# Patient Record
Sex: MALE | Race: OTHER | NOT HISPANIC OR LATINO
[De-identification: names, ages, dates, MRNs, and addresses within clinical notes are randomized per-mention and may not be internally consistent; named-entity substitution may affect disease eponyms.]

---

## 2017-11-29 ENCOUNTER — APPOINTMENT (OUTPATIENT)
Dept: OTOLARYNGOLOGY | Facility: CLINIC | Age: 37
End: 2017-11-29
Payer: COMMERCIAL

## 2017-11-29 VITALS
SYSTOLIC BLOOD PRESSURE: 128 MMHG | TEMPERATURE: 98.2 F | HEIGHT: 72 IN | WEIGHT: 215 LBS | HEART RATE: 61 BPM | DIASTOLIC BLOOD PRESSURE: 79 MMHG | BODY MASS INDEX: 29.12 KG/M2

## 2017-11-29 PROCEDURE — 99213 OFFICE O/P EST LOW 20 MIN: CPT

## 2018-12-12 ENCOUNTER — APPOINTMENT (OUTPATIENT)
Dept: OTOLARYNGOLOGY | Facility: CLINIC | Age: 38
End: 2018-12-12
Payer: COMMERCIAL

## 2018-12-12 VITALS
HEART RATE: 73 BPM | BODY MASS INDEX: 27.09 KG/M2 | HEIGHT: 72 IN | SYSTOLIC BLOOD PRESSURE: 130 MMHG | DIASTOLIC BLOOD PRESSURE: 92 MMHG | WEIGHT: 200 LBS | OXYGEN SATURATION: 99 %

## 2018-12-12 PROCEDURE — 99213 OFFICE O/P EST LOW 20 MIN: CPT

## 2019-06-13 ENCOUNTER — APPOINTMENT (OUTPATIENT)
Dept: SURGICAL ONCOLOGY | Facility: CLINIC | Age: 39
End: 2019-06-13
Payer: COMMERCIAL

## 2019-06-13 VITALS
RESPIRATION RATE: 16 BRPM | BODY MASS INDEX: 26.41 KG/M2 | HEART RATE: 57 BPM | HEIGHT: 72 IN | DIASTOLIC BLOOD PRESSURE: 89 MMHG | TEMPERATURE: 98 F | WEIGHT: 195 LBS | OXYGEN SATURATION: 100 % | SYSTOLIC BLOOD PRESSURE: 147 MMHG

## 2019-06-13 DIAGNOSIS — R22.30 LOCALIZED SWELLING, MASS AND LUMP, UNSPECIFIED UPPER LIMB: ICD-10-CM

## 2019-06-13 PROCEDURE — 99204 OFFICE O/P NEW MOD 45 MIN: CPT

## 2019-06-19 ENCOUNTER — FORM ENCOUNTER (OUTPATIENT)
Age: 39
End: 2019-06-19

## 2019-06-20 ENCOUNTER — OUTPATIENT (OUTPATIENT)
Dept: OUTPATIENT SERVICES | Facility: HOSPITAL | Age: 39
LOS: 1 days | End: 2019-06-20
Payer: COMMERCIAL

## 2019-06-20 ENCOUNTER — APPOINTMENT (OUTPATIENT)
Dept: ULTRASOUND IMAGING | Facility: CLINIC | Age: 39
End: 2019-06-20
Payer: COMMERCIAL

## 2019-06-20 ENCOUNTER — APPOINTMENT (OUTPATIENT)
Dept: RADIOLOGY | Facility: CLINIC | Age: 39
End: 2019-06-20
Payer: COMMERCIAL

## 2019-06-20 DIAGNOSIS — R22.30 LOCALIZED SWELLING, MASS AND LUMP, UNSPECIFIED UPPER LIMB: ICD-10-CM

## 2019-06-20 PROCEDURE — 76882 US LMTD JT/FCL EVL NVASC XTR: CPT

## 2019-06-20 PROCEDURE — 76882 US LMTD JT/FCL EVL NVASC XTR: CPT | Mod: 26,LT

## 2019-06-20 PROCEDURE — 71046 X-RAY EXAM CHEST 2 VIEWS: CPT

## 2019-06-20 PROCEDURE — 71046 X-RAY EXAM CHEST 2 VIEWS: CPT | Mod: 26

## 2019-12-09 ENCOUNTER — APPOINTMENT (OUTPATIENT)
Dept: OTOLARYNGOLOGY | Facility: CLINIC | Age: 39
End: 2019-12-09

## 2020-01-08 ENCOUNTER — APPOINTMENT (OUTPATIENT)
Dept: OTOLARYNGOLOGY | Facility: CLINIC | Age: 40
End: 2020-01-08
Payer: COMMERCIAL

## 2020-01-08 VITALS
SYSTOLIC BLOOD PRESSURE: 142 MMHG | BODY MASS INDEX: 25.73 KG/M2 | DIASTOLIC BLOOD PRESSURE: 86 MMHG | OXYGEN SATURATION: 98 % | WEIGHT: 190 LBS | HEIGHT: 72 IN

## 2020-01-08 PROCEDURE — 99213 OFFICE O/P EST LOW 20 MIN: CPT

## 2020-01-10 NOTE — ASSESSMENT
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 R TMJ pain.\par \par - F/U in 12 months.\par - Counseled on smoking cessation and increased risk of oral/OP malignancy.\par - Advised pt to see a oral/maxillofacial specialist for TMJ, he prefers to find someone local.\par - RTC should any worrisome symptoms develop. \par - Pt verbalized understanding of above plan.

## 2020-01-10 NOTE — HISTORY OF PRESENT ILLNESS
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 In 2015, he had questionable bilateral level II nodes, negative on FNA.\par \par Pt presents today with his mother Bernie. Pt is doing well, no changes in medical history. Denies fever, chills, weight loss, dysphagia, new neck masses/lesions. He reports right TMJ discomfort radiating to the ear.

## 2020-01-10 NOTE — REASON FOR VISIT
[Subsequent Evaluation] : a subsequent evaluation for [Parent] : parent [FreeTextEntry2] : surveillance of left hard palate mucoepidermoid ca

## 2020-04-17 NOTE — PHYSICAL EXAM
[Normal] : supple, no neck mass and thyroid not enlarged [Normal Neck Lymph Nodes] : normal neck lymph nodes  [Normal Supraclavicular Lymph Nodes] : normal supraclavicular lymph nodes [Normal Axillary Lymph Nodes] : normal axillary lymph nodes [Normal] : normal appearance, no rash, nodules, vesicles, ulcers, erythema [de-identified] : Groins not examined [de-identified] : Below

## 2020-04-17 NOTE — ASSESSMENT
[FreeTextEntry1] : The underlying cause for the abnormality on physical examination in the left axillary region is not apparent on my assessment, although I am able to see and feel the area.\par \par I suggested a chest x-ray and targeted ultrasound of the left axilla for further characterization.\par They are in agreement and a prescription was entered today.\par \par If there are no worrisome features, then short-term reexamination and re-imaging should be adequate.\par Any equivocal findings will be biopsied.\par \par Reviewed in detail, all questions answered\par \par \par 06/20/19:\par Palpable left axillary mass, on sonography, appears to represent a lipoma (2.5 x 0.9 x 2.5 cm).\par Amenable to clinical followup\par \par Accompanying chest x-ray was normal

## 2020-04-17 NOTE — REASON FOR VISIT
[Initial Consultation] : an initial consultation for [Other: _____] : [unfilled] [FreeTextEntry2] : Axillary mass

## 2020-04-17 NOTE — HISTORY OF PRESENT ILLNESS
[de-identified] : 38-year-old man being referred by his dermatologist Dr. Dex MAGAÑA with a "lipoma under (his left) arm".\par \par This is an asymptomatic mass that he noticed on self-examination February 2019.\par He brought it to the attention of his internist, who did not feel any intervention was warranted.\par It has not grown or changed, but due to persistence he had it reevaluated.\par His physician did not think it it changed either, however since it had not resolved and he was referred to dermatology for further assessment.\par \par Their evaluation led to this referral.\par \par +Low-grade MUCOEPIDERMOID CARCINOMA of his left HARD PALATE\par Resected November 2004 by Dr. Diaz COLBY.\par He did not require any adjuvant therapy.\par \par No other personal history of malignancy.\par \par His paternal grandfather had lung cancer.\par His paternal grandfather had skin cancer.\par No other relatives with a history of malignancy\par \par \par His internist is Dr. Christofer HUTCHINSON\par \par No other significant past medical history.\par No prescription medications.

## 2021-01-25 ENCOUNTER — APPOINTMENT (OUTPATIENT)
Dept: OTOLARYNGOLOGY | Facility: CLINIC | Age: 41
End: 2021-01-25
Payer: COMMERCIAL

## 2021-01-25 VITALS
HEART RATE: 69 BPM | BODY MASS INDEX: 25.73 KG/M2 | HEIGHT: 72 IN | WEIGHT: 190 LBS | TEMPERATURE: 98 F | OXYGEN SATURATION: 97 % | DIASTOLIC BLOOD PRESSURE: 73 MMHG | SYSTOLIC BLOOD PRESSURE: 125 MMHG

## 2021-01-25 PROCEDURE — 99072 ADDL SUPL MATRL&STAF TM PHE: CPT

## 2021-01-25 PROCEDURE — 99212 OFFICE O/P EST SF 10 MIN: CPT

## 2021-01-26 NOTE — HISTORY OF PRESENT ILLNESS
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 In 2015, he had questionable bilateral level II nodes, negative on FNA.\par \par Pt presents today with his mother Bernie. Pt is doing well, no changes in medical history. Denies fever, chills, weight loss, dysphagia, new neck masses/lesions. \par

## 2021-01-26 NOTE — ASSESSMENT
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 \par \par - F/U in 12 months.\par - RTC should any worrisome symptoms develop. \par - Pt verbalized understanding of above plan. \par \par

## 2021-01-26 NOTE — PHYSICAL EXAM
[Normal] : orientation to person, place, and time: normal [de-identified] : well healed left palate excision site without evidence of tumor, lesion, ulceration, erythema or edema

## 2021-01-26 NOTE — REASON FOR VISIT
[Subsequent Evaluation] : a subsequent evaluation for [Parent] : parent [FreeTextEntry2] : surveillance of left hard palate mucoepidermoid ca\par

## 2021-07-07 ENCOUNTER — APPOINTMENT (OUTPATIENT)
Dept: OTOLARYNGOLOGY | Facility: CLINIC | Age: 41
End: 2021-07-07
Payer: COMMERCIAL

## 2021-07-07 VITALS
HEART RATE: 73 BPM | TEMPERATURE: 97.9 F | OXYGEN SATURATION: 98 % | SYSTOLIC BLOOD PRESSURE: 147 MMHG | HEIGHT: 72 IN | WEIGHT: 190 LBS | BODY MASS INDEX: 25.73 KG/M2 | DIASTOLIC BLOOD PRESSURE: 88 MMHG

## 2021-07-07 DIAGNOSIS — M54.2 CERVICALGIA: ICD-10-CM

## 2021-07-07 PROCEDURE — 99072 ADDL SUPL MATRL&STAF TM PHE: CPT

## 2021-07-07 PROCEDURE — 99213 OFFICE O/P EST LOW 20 MIN: CPT

## 2021-07-09 PROBLEM — M54.2 NECK PAIN: Status: ACTIVE | Noted: 2021-07-01

## 2021-07-09 NOTE — HISTORY OF PRESENT ILLNESS
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 In 2015, he had questionable bilateral level II nodes, negative on FNA. [FreeTextEntry1] : Pt presents today with his mother Bernie. Reports jaw/TMJ pain and neck pain. Reports the neck pain has resolved.  He is going to follow up with a TMJ specialist.  Denies fever, chills, weight loss, dysphagia, new neck masses/lesions.  He has CT neck done yesterday and MRI scheduled for this evening.\par

## 2021-07-09 NOTE — PHYSICAL EXAM
[Normal] : orientation to person, place, and time: normal [de-identified] : TMJ pain [de-identified] : well healed left palate excision site without evidence of tumor, lesion, ulceration, erythema or edema

## 2021-07-09 NOTE — ASSESSMENT
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 c/o jaw pain, likely TMJD.\par \par - pending CT/MRI neck results - will call pt with results.\par - follow up with TMJ specialist\par - advised pt of my impending departure. Referred to Mehul Cobian for continuation of care.\par - Pt verbalized understanding of above plan. \par

## 2022-01-24 ENCOUNTER — APPOINTMENT (OUTPATIENT)
Dept: OTOLARYNGOLOGY | Facility: CLINIC | Age: 42
End: 2022-01-24

## 2022-08-02 ENCOUNTER — FORM ENCOUNTER (OUTPATIENT)
Age: 42
End: 2022-08-02

## 2022-08-02 ENCOUNTER — APPOINTMENT (OUTPATIENT)
Dept: ORTHOPEDIC SURGERY | Facility: CLINIC | Age: 42
End: 2022-08-02
Payer: COMMERCIAL

## 2022-08-02 VITALS — WEIGHT: 205 LBS | BODY MASS INDEX: 27.17 KG/M2 | HEIGHT: 73 IN

## 2022-08-02 PROCEDURE — 99203 OFFICE O/P NEW LOW 30 MIN: CPT

## 2022-08-02 PROCEDURE — 99204 OFFICE O/P NEW MOD 45 MIN: CPT
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
## 2022-08-02 NOTE — PHYSICAL EXAM
[Left] : left knee [Right] : right knee [AP] : anteroposterior [Lateral] : lateral [[place]] : skyline [Outside films reviewed] : Outside films reviewed [Fracture] : Fracture [] : negative Valgus instability [FreeTextEntry9] : segond fracture

## 2022-08-02 NOTE — PROCEDURE
[Large Joint Injection] : Large joint injection [Right] : of the right [Knee] : knee [Pain] : pain [Inflammation] : inflammation [Alcohol] : alcohol [Betadine] : betadine [Ethyl Chloride sprayed topically] : ethyl chloride sprayed topically [Sterile technique used] : sterile technique used [___ cc    1%] : Lidocaine ~Vcc of 1%  [Effusion] : effusion [de-identified] : 85cc [de-identified] : blood
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
## 2022-08-03 ENCOUNTER — APPOINTMENT (OUTPATIENT)
Dept: MRI IMAGING | Facility: CLINIC | Age: 42
End: 2022-08-03

## 2022-08-03 PROCEDURE — 73721 MRI JNT OF LWR EXTRE W/O DYE: CPT | Mod: RT

## 2022-08-04 ENCOUNTER — APPOINTMENT (OUTPATIENT)
Dept: ORTHOPEDIC SURGERY | Facility: CLINIC | Age: 42
End: 2022-08-04

## 2022-08-04 VITALS — BODY MASS INDEX: 27.17 KG/M2 | HEIGHT: 73 IN | WEIGHT: 205 LBS

## 2022-08-04 PROCEDURE — 99213 OFFICE O/P EST LOW 20 MIN: CPT | Mod: 57

## 2022-08-04 PROCEDURE — 99214 OFFICE O/P EST MOD 30 MIN: CPT

## 2022-08-04 NOTE — PHYSICAL EXAM
[Left] : left knee [Right] : right knee [AP] : anteroposterior [Lateral] : lateral [[place]] : skyline [Outside films reviewed] : Outside films reviewed [Fracture] : Fracture [] : negative Valgus instability [FreeTextEntry9] : segond fracture

## 2022-08-04 NOTE — HISTORY OF PRESENT ILLNESS
[8] : 8 [5] : 5 [de-identified] : 08/04/22: Here to f/up right knee and review MRI results. \par 08/02/2022 Mr. DEVIN SHETH,  41 year old  male , presents today for right knee. Injured on 07.29.22 with a twist and fall. Reports right knee pain and swelling. Difficulty with walking and has been using crutches for assistance. Also with left knee abrasion. \par  [FreeTextEntry1] : rt knee  [FreeTextEntry5] :  DEVIN SHETH is a 41 year male who is here today for rt knee MRI results.  [de-identified] : MRI

## 2022-08-04 NOTE — DATA REVIEWED
[MRI] : MRI [Right] : of the right [Knee] : knee [Report was reviewed and noted in the chart] : The report was reviewed and noted in the chart [I independently reviewed and interpreted images and report] : I independently reviewed and interpreted images and report [I reviewed the films/CD] : I reviewed the films/CD [FreeTextEntry1] : 1. Acute complete retracted ACL tear.\par 2. Complex medial meniscal tearing with multiple displaced meniscal flaps.\par 3. Posterior lateral meniscal root tearing is suspected associated with ligament of Wrisberg (Wrisberg's rip).\par 4. Moderate MCL sprain. Severe lateral capsular sprain with nondisplaced Segond fracture at the distal attachment at the lateral tibial plateau.\par 5. Mild PCL sprain.\par 6. Mild fibular collateral ligament sprain.\par 7. Subchondral fracture associated with contusions in the posterior medial and posterior lateral tibial plateau with depression in the posterior lip of the lateral tibial plateau where there is chondral injury and possible loose chondral fragments. \par 8. Small bone contusion in the peripheral lateral femoral condyle, effusion, soft tissue swelling and muscle strains.\par 9. Full thickness chondral loss in the central to posterior medial femoral condyle over an area measuring 1.5 cm.\par \par
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
## 2022-08-19 ENCOUNTER — APPOINTMENT (OUTPATIENT)
Dept: ORTHOPEDIC SURGERY | Facility: CLINIC | Age: 42
End: 2022-08-19

## 2022-09-01 ENCOUNTER — APPOINTMENT (OUTPATIENT)
Dept: ORTHOPEDIC SURGERY | Facility: CLINIC | Age: 42
End: 2022-09-01

## 2022-09-01 VITALS — WEIGHT: 205 LBS | BODY MASS INDEX: 27.77 KG/M2 | HEIGHT: 72 IN

## 2022-09-01 PROCEDURE — 99214 OFFICE O/P EST MOD 30 MIN: CPT

## 2022-09-01 NOTE — HISTORY OF PRESENT ILLNESS
[8] : 8 [5] : 5 [de-identified] : 09/01/22: Here to f/up right knee. PT and bracing has been mildly beneficial. Surgery had been discussed at last visit and pt is ready to schedule. \par 08/04/22: Here to f/up right knee and review MRI results. \par 08/02/2022 Mr. DEVIN SHETH,  41 year old  male , presents today for right knee. Injured on 07.29.22 with a twist and fall. Reports right knee pain and swelling. Difficulty with walking and has been using crutches for assistance. Also with left knee abrasion.  [FreeTextEntry1] : rt knee  [FreeTextEntry5] :  DEVIN SHETH is a 41 year male who is here today for rt knee MRI results.  [de-identified] : PT 2x a week - pt states this has helped reduce his pain\par Ice\par Knee brace

## 2022-09-01 NOTE — DISCUSSION/SUMMARY
[de-identified] : 41m with right knee complete tear of the ACL, complex medial meniscal tear, lateral meniscal tear ; also with Segond fracture, subchdonral fracture of the tibial plateaus and bone contusions and chondral loss. \par \par Reviewed surgical intervention vs. conservative management. Again discussed preop/postop periods, r/b/a, and the surgery in detail. pt given to opportunity to ask all questions and all questions were answered.  Pt and his wife are well informed and would like to proceed with surgery as discussed.\par \par Will plan for right knee ACL recon, allograft with meniscal root repair vs. partial resection\par \par \par The patient was advised of the diagnosis. The natural history of the pathology was explained in full to the patient in layman's terms. All questions were answered. The risks and benefits of surgical and non-surgical treatment alternatives were explained in full to the patient. \par The patient demonstrated a full understanding of the surgical and non-surgical options. The risks of surgery were outlined in full to the patient including but not limited to bleeding, scarring, infection, sepsis, neurologic injury, vascular injury, failure to resolve symptoms, symptom recurrence, the need for further surgery, non-healing, wound breakdown, deep vein thrombosis, pulmonary embolism, spontaneous osteonecrosis, anesthesia complications and even death. The patient understood all the risks and accepted them and understood that other complications could occur that are not mentioned above. The intraoperative plan, post-operative plan, post-operative expectations and limitations were explained in full. Expectations from non-surgical treatment were explained in full as well. The patient demonstrated a complete understanding of the treatment alternatives and requested the above-mentioned procedure. This will be scheduled accordingly\par  \par The patient was advised of the diagnosis. The natural history of the pathology was explained in full to the patient in layman's terms. All questions were answered. The risks and benefits of surgical and non-surgical treatment alternatives were explained in full to the patient. \par The patient demonstrated a full understanding of the surgical and non-surgical options. The risks of surgery were outlined in full to the patient including but not limited to bleeding, scarring, infection, sepsis, neurologic injury, vascular injury, failure to resolve symptoms, symptom recurrence, the need for further surgery, non-healing, wound breakdown, deep vein thrombosis, pulmonary embolism, spontaneous osteonecrosis, anesthesia complications and even death. \par More specifically, we discussed that there could be injury to the infrapatellar branch of the saphenous nerve, causing permanent numbness near the incision, re-rupture of the ACL, motion loss, persistent pain even after surgery, among other risks. \par The patient understood all the risks and accepted them and understood that other complications could occur that are not mentioned above. The intraoperative plan, post-operative plan, post-operative expectations and limitations were explained in full. Expectations from non-surgical treatment were explained in full as well. The patient demonstrated a complete understanding of the treatment alternatives and requested the above-mentioned procedure. This will be scheduled accordingly.\par \par \par

## 2022-09-01 NOTE — PHYSICAL EXAM
[Left] : left knee [Right] : right knee [AP] : anteroposterior [Lateral] : lateral [[place]] : skyline [Outside films reviewed] : Outside films reviewed [Fracture] : Fracture [] : negative Valgus instability [FreeTextEntry9] : segond fracture

## 2022-09-11 ENCOUNTER — FORM ENCOUNTER (OUTPATIENT)
Age: 42
End: 2022-09-11

## 2022-09-30 ENCOUNTER — APPOINTMENT (OUTPATIENT)
Dept: ORTHOPEDIC SURGERY | Facility: CLINIC | Age: 42
End: 2022-09-30

## 2022-09-30 VITALS — BODY MASS INDEX: 27.77 KG/M2 | WEIGHT: 205 LBS | HEIGHT: 72 IN

## 2022-09-30 VITALS — BODY MASS INDEX: 27.77 KG/M2 | HEIGHT: 72 IN | WEIGHT: 205 LBS

## 2022-09-30 PROCEDURE — 99214 OFFICE O/P EST MOD 30 MIN: CPT

## 2022-09-30 NOTE — PHYSICAL EXAM
[Left] : left knee [Right] : right knee [AP] : anteroposterior [Lateral] : lateral [[place]] : skyline [Outside films reviewed] : Outside films reviewed [Fracture] : Fracture [FreeTextEntry9] : segond fracture [NL (0)] : extension 0 degrees [] : negative Valgus instability [TWNoteComboBox7] : flexion 135 degrees

## 2022-09-30 NOTE — HISTORY OF PRESENT ILLNESS
[3] : 3 [0] : 0 [Localized] : localized [Ting[place]] : tingling [de-identified] : 09/30/22: Here to f/up right knee. Now scheduled for right knee ACL recon with medial meniscal root repair for 10.03.22. Presesnts today with additional questions prior to surgery.\par 09/01/22: Here to f/up right knee. PT and bracing has been mildly beneficial. Surgery had been discussed at last visit and pt is ready to schedule. \par 08/04/22: Here to f/up right knee and review MRI results. \par 08/02/2022 Mr. DEVIN SHETH,  41 year old  male , presents today for right knee. Injured on 07.29.22 with a twist and fall. Reports right knee pain and swelling. Difficulty with walking and has been using crutches for assistance. Also with left knee abrasion.  [] : no [FreeTextEntry1] : rt knee  [FreeTextEntry5] : PT states improvement since last visit. Pt noticed slight swelling in R knee. Pt states he completed PT ~3 weeks ago. [de-identified] : 9/1/2022 [de-identified] : Dr Delgado [de-identified] : PT 2x a week - pt states this has helped reduce his pain\par Ice\par Knee brace

## 2022-09-30 NOTE — DISCUSSION/SUMMARY
[de-identified] : 41m with right knee complete tear of the ACL, complex medial meniscal tear, lateral meniscal tear ; also with Segond fracture, subchdonral fracture of the tibial plateaus and bone contusions and chondral loss.  Scheduled for surgery 10.03.22.\par \par Reviewed r/b/a of surgical intervention and conservative management discussed. pt given to opportunity to ask all questions and all questions were answered.   Pt would like to proceed with surgery as discussed.\par \par \par \par \par \par

## 2022-10-03 ENCOUNTER — APPOINTMENT (OUTPATIENT)
Age: 42
End: 2022-10-03

## 2022-10-03 PROCEDURE — 29880 ARTHRS KNE SRG MNISECTMY M&L: CPT | Mod: 59,RT

## 2022-10-03 PROCEDURE — 29888 ARTHRS AID ACL RPR/AGMNTJ: CPT | Mod: RT

## 2022-10-03 PROCEDURE — 29888 ARTHRS AID ACL RPR/AGMNTJ: CPT | Mod: AS,RT

## 2022-10-03 PROCEDURE — 29880 ARTHRS KNE SRG MNISECTMY M&L: CPT | Mod: AS,59,RT

## 2022-10-03 RX ORDER — ASPIRIN 325 MG/1
325 TABLET, FILM COATED ORAL DAILY
Qty: 14 | Refills: 0 | Status: ACTIVE | COMMUNITY
Start: 2022-10-03 | End: 1900-01-01

## 2022-10-03 RX ORDER — OXYCODONE AND ACETAMINOPHEN 5; 325 MG/1; MG/1
5-325 TABLET ORAL
Qty: 30 | Refills: 0 | Status: ACTIVE | COMMUNITY
Start: 2022-10-03 | End: 1900-01-01

## 2022-10-06 ENCOUNTER — APPOINTMENT (OUTPATIENT)
Dept: ORTHOPEDIC SURGERY | Facility: CLINIC | Age: 42
End: 2022-10-06

## 2022-10-06 PROCEDURE — 99024 POSTOP FOLLOW-UP VISIT: CPT

## 2022-10-06 NOTE — HISTORY OF PRESENT ILLNESS
[3] : 3 [Dull/Aching] : dull/aching [Sharp] : sharp [Constant] : constant [Household chores] : household chores [Leisure] : leisure [Rest] : rest [Meds] : meds [Standing] : standing [Walking] : walking [de-identified] : dos: \par 10/06/2022: Pt here for pov1 s/p right knee ACL recon. Doing well. Complaint with brace. Denies f/c/s. \par \par 09/30/22: Here to f/up right knee. Now scheduled for right knee ACL recon with medial meniscal root repair for 10.03.22. Presents today with additional questions prior to surgery.\par 09/01/22: Here to f/up right knee. PT and bracing has been mildly beneficial. Surgery had been discussed at last visit and pt is ready to schedule. \par 08/04/22: Here to f/up right knee and review MRI results. \par 08/02/2022 Mr. DEVIN SHETH,  41 year old  male , presents today for right knee. Injured on 07.29.22 with a twist and fall. Reports right knee pain and swelling. Difficulty with walking and has been using crutches for assistance. Also with left knee abrasion.  [] : no [FreeTextEntry1] : Right knee [FreeTextEntry5] : DEVIN SHETH is a 41 year old M here for PO #1 for the right knee.  [FreeTextEntry6] : Soreness [de-identified] : 10/03/2022 [de-identified] : 10/3/2022

## 2022-10-06 NOTE — DISCUSSION/SUMMARY
[de-identified] : 41m s/p right knee ACL recon 10.03.22\par 1) c/w caryn brace locked in extension\par 2) reviewed post-op precautions and procedures.\par 3) start physical therapy, protocol provided\par 4) rtc  2 weeks \par \par Entered by Chanel Tinsley acting as scribe.\par

## 2022-10-18 ENCOUNTER — APPOINTMENT (OUTPATIENT)
Dept: ORTHOPEDIC SURGERY | Facility: CLINIC | Age: 42
End: 2022-10-18

## 2022-10-18 VITALS — WEIGHT: 205 LBS | BODY MASS INDEX: 27.77 KG/M2 | HEIGHT: 72 IN

## 2022-10-18 PROCEDURE — 99024 POSTOP FOLLOW-UP VISIT: CPT

## 2022-10-18 NOTE — DISCUSSION/SUMMARY
[de-identified] : 41m s/p right knee ACL recon 10.03.22\par 1) c/w caryn brace, unlocked, can take off to sleep and can d/c in 2 weeks\par 2) reviewed post-op precautions and procedures.\par 3) c/w physical therapy, as per protocol \par 4) rtc 4 weeks\par \par Entered by Chanel Tinsley acting as scribe.\par

## 2022-10-18 NOTE — HISTORY OF PRESENT ILLNESS
[3] : 3 [Dull/Aching] : dull/aching [Sharp] : sharp [Constant] : constant [Household chores] : household chores [Leisure] : leisure [Rest] : rest [Meds] : meds [Standing] : standing [Walking] : walking [de-identified] : dos: 10.036.22\par 10/18/22: here for pov2 s/p right knee acl recon. Doing well. Complaint with brace. Has started physical therapy.  \par 10/06/2022: Pt here for pov1 s/p right knee ACL recon. Doing well. Complaint with brace. Denies f/c/s. \par \par 09/30/22: Here to f/up right knee. Now scheduled for right knee ACL recon with medial meniscal root repair for 10.03.22. Presents today with additional questions prior to surgery.\par 09/01/22: Here to f/up right knee. PT and bracing has been mildly beneficial. Surgery had been discussed at last visit and pt is ready to schedule. \par 08/04/22: Here to f/up right knee and review MRI results. \par 08/02/2022 Mr. DEVIN SHETH,  41 year old  male , presents today for right knee. Injured on 07.29.22 with a twist and fall. Reports right knee pain and swelling. Difficulty with walking and has been using crutches for assistance. Also with left knee abrasion.  [] : no [FreeTextEntry1] : Right knee [FreeTextEntry5] : DEVIN SHETH is a 41 year old M here for PO #2 for the right knee. Pt states improvement since last visit. Pt states slight swelling in R knee. Pt has been compliant with PT going 3x a week. [FreeTextEntry6] : Soreness [de-identified] : 10/03/2022 [de-identified] : PT 3x a week [de-identified] : 10/3/2022

## 2022-11-15 ENCOUNTER — APPOINTMENT (OUTPATIENT)
Dept: ORTHOPEDIC SURGERY | Facility: CLINIC | Age: 42
End: 2022-11-15

## 2022-11-15 VITALS — HEIGHT: 72 IN | WEIGHT: 205 LBS | BODY MASS INDEX: 27.77 KG/M2

## 2022-11-15 PROCEDURE — 99024 POSTOP FOLLOW-UP VISIT: CPT
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
## 2022-11-15 NOTE — DISCUSSION/SUMMARY
[de-identified] : 41m s/p right knee ACL recon 10.03.22\par 1) aspiration right knee today - tolerated well \par 2) reviewed post-op precautions and procedures.\par 3) c/w physical therapy, as per protocol \par 4) rtc \par \par Entered by Chanel Tinsley acting as scribe.\par

## 2022-11-15 NOTE — PROCEDURE
[Large Joint Injection] : Large joint injection [Right] : of the right [Knee] : knee [Pain] : pain [Inflammation] : inflammation [Alcohol] : alcohol [Betadine] : betadine [Ethyl Chloride sprayed topically] : ethyl chloride sprayed topically [Sterile technique used] : sterile technique used [___ cc    1%] : Lidocaine ~Vcc of 1%  [___ cc    0.5%] : Bupivacaine (Marcaine) ~Vcc of 0.5%  [Effusion] : effusion [] : Patient tolerated procedure well [Call if redness, pain or fever occur] : call if redness, pain or fever occur [Prior failure or difficult injection] : prior failure or difficult injection [All ultrasound images have been permanently captured and stored accordingly in our picture archiving and communication system] : All ultrasound images have been permanently captured and stored accordingly in our picture archiving and communication system [Visualization of the needle and placement of injection was performed without complication] : visualization of the needle and placement of injection was performed without complication [de-identified] : 55cc [de-identified] : clear

## 2022-11-18 ENCOUNTER — APPOINTMENT (OUTPATIENT)
Dept: OTOLARYNGOLOGY | Facility: CLINIC | Age: 42
End: 2022-11-18

## 2022-12-29 ENCOUNTER — APPOINTMENT (OUTPATIENT)
Dept: ORTHOPEDIC SURGERY | Facility: CLINIC | Age: 42
End: 2022-12-29
Payer: COMMERCIAL

## 2022-12-29 PROCEDURE — L1852: CPT | Mod: RT

## 2022-12-29 PROCEDURE — 99024 POSTOP FOLLOW-UP VISIT: CPT

## 2022-12-29 NOTE — DISCUSSION/SUMMARY
[de-identified] : 41m s/p right knee ACL recon 10.03.22\par 1) c/w physical therapy and hep \par 2) cryotherapy, rest and activity modification\par 3) ACL functional brace\par 4) rtc 6 weeks, will consider aspiration if effusion persists. \par \par \par Entered by Chanel Tinsley acting as scribe.\par
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
## 2023-02-02 ENCOUNTER — APPOINTMENT (OUTPATIENT)
Dept: ORTHOPEDIC SURGERY | Facility: CLINIC | Age: 43
End: 2023-02-02
Payer: COMMERCIAL

## 2023-02-02 VITALS — WEIGHT: 215 LBS | HEIGHT: 72 IN | BODY MASS INDEX: 29.12 KG/M2

## 2023-02-02 PROCEDURE — 99213 OFFICE O/P EST LOW 20 MIN: CPT

## 2023-02-02 NOTE — PROCEDURE
[Other: ____] : [unfilled] [Right] : of the right [Knee] : knee [Effusion] : effusion [de-identified] : 35cc [de-identified] : clear/straw

## 2023-02-02 NOTE — DISCUSSION/SUMMARY
[de-identified] : 41m s/p right knee ACL recon 10.03.22\par 1) c/w physical therapy and hep \par 2) Aspiration right knee today - tolerated well\par 2) cryotherapy, rest and activity modification\par 3) c/w  ACL functional brace for increased activity. \par 4) rtc 2 months\par \par \par Entered by Hunter RICKS acting as scribe. \par

## 2023-02-02 NOTE — PHYSICAL EXAM
[Right] : right knee [NL (0)] : extension 0 degrees [] : negative Lachmann [TWNoteComboBox7] : flexion 130 degrees
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
## 2023-04-06 ENCOUNTER — APPOINTMENT (OUTPATIENT)
Dept: ORTHOPEDIC SURGERY | Facility: CLINIC | Age: 43
End: 2023-04-06
Payer: COMMERCIAL

## 2023-04-06 VITALS — HEIGHT: 74 IN | WEIGHT: 215 LBS | BODY MASS INDEX: 27.59 KG/M2

## 2023-04-06 DIAGNOSIS — M25.461 EFFUSION, RIGHT KNEE: ICD-10-CM

## 2023-04-06 PROCEDURE — 20611 DRAIN/INJ JOINT/BURSA W/US: CPT

## 2023-04-06 PROCEDURE — 99213 OFFICE O/P EST LOW 20 MIN: CPT | Mod: 25

## 2023-04-06 PROCEDURE — J3490M: CUSTOM

## 2023-04-06 NOTE — DISCUSSION/SUMMARY
[de-identified] : 41m s/p right knee ACL recon 10.03.22\par 1) c/w physical therapy and hep \par 2)  csi / Aspiration right knee today - tolerated well - 40cc\par 2) cryotherapy, rest and activity / exercise modification\par 3) c/w  ACL functional brace for increased activity. \par \par \par Entered by Chanel Tinsley acting as scribe.\par Dr. Delgado-\par The documentation recorded by the scribe accurately reflects the service I personally performed and the decisions made by me.

## 2023-04-06 NOTE — PROCEDURE
[Other: ____] : [unfilled] [Right] : of the right [Knee] : knee [Effusion] : effusion [de-identified] : 40cc [de-identified] : clear / straw [FreeTextEntry3] : Large joint injection was performed of the right knee. An injection of Lidocaine 3cc of 1% , Bupivacaine (Marcaine) 6cc of 0.5% , Triamcinolone (Kenalog) 2cc of 40 mg  was used. Patient was advised to call if redness, pain or fever occur and apply ice for 15 minutes out of every hour for the next 12-24 hours as tolerated. \par \par Patient has tried OTC's including aspirin, Ibuprofen, Aleve, etc or prescription NSAIDS, and/or exercises at home and/or physical therapy without satisfactory response, patient had decreased mobility in the joint and the risks benefits, and alternatives have been discussed, and verbal consent was obtained. \par The site was prepped with alcohol, betadine and ethyl chloride sprayed topically\par \par The risks, benefits and contents of the injection have been discussed.  Risks include but are not limited to allergic reaction, flare reaction, permanent white skin discoloration at the injection site and infection.  The patient understands the risks and agrees to having the injection.  All questions have been answered.\par \par Ultrasound guidance was indicated for this patient due to prior failure or difficult injection. All ultrasound images have been permanently captured and stored accordingly in our picture archiving and communication system.\par

## 2023-05-23 ENCOUNTER — APPOINTMENT (OUTPATIENT)
Dept: ORTHOPEDIC SURGERY | Facility: CLINIC | Age: 43
End: 2023-05-23
Payer: COMMERCIAL

## 2023-05-23 VITALS — HEIGHT: 74 IN | WEIGHT: 215 LBS | BODY MASS INDEX: 27.59 KG/M2

## 2023-05-23 DIAGNOSIS — M23.91 UNSPECIFIED INTERNAL DERANGEMENT OF RIGHT KNEE: ICD-10-CM

## 2023-05-23 PROCEDURE — 73564 X-RAY EXAM KNEE 4 OR MORE: CPT | Mod: RT

## 2023-05-23 PROCEDURE — 99213 OFFICE O/P EST LOW 20 MIN: CPT

## 2023-05-24 NOTE — PHYSICAL EXAM
[Right] : right knee [5___] : hamstring 5[unfilled]/5 [Positive] : positive anterior draw [] : no extensor lag [de-identified] : using knee sleeve [TWNoteComboBox7] : flexion 115 degrees [de-identified] : extension 5 degrees
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
## 2023-05-24 NOTE — IMAGING
[Right] : right knee [All Views] : anteroposterior, lateral, skyline, and anteroposterior standing [There are no fractures, subluxations or dislocations. No significant abnormalities are seen] : There are no fractures, subluxations or dislocations. No significant abnormalities are seen [Tunnels and hardware in proper position] : Tunnels and hardware in proper position
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
## 2023-05-31 ENCOUNTER — FORM ENCOUNTER (OUTPATIENT)
Age: 43
End: 2023-05-31

## 2023-06-01 ENCOUNTER — APPOINTMENT (OUTPATIENT)
Dept: MRI IMAGING | Facility: CLINIC | Age: 43
End: 2023-06-01
Payer: COMMERCIAL

## 2023-06-01 PROCEDURE — 73721 MRI JNT OF LWR EXTRE W/O DYE: CPT | Mod: RT

## 2023-06-06 ENCOUNTER — APPOINTMENT (OUTPATIENT)
Dept: ORTHOPEDIC SURGERY | Facility: CLINIC | Age: 43
End: 2023-06-06
Payer: COMMERCIAL

## 2023-06-06 VITALS — WEIGHT: 215 LBS | BODY MASS INDEX: 27.59 KG/M2 | HEIGHT: 74 IN

## 2023-06-06 PROCEDURE — 20610 DRAIN/INJ JOINT/BURSA W/O US: CPT | Mod: RT

## 2023-06-06 PROCEDURE — 99214 OFFICE O/P EST MOD 30 MIN: CPT | Mod: 25

## 2023-06-06 NOTE — PHYSICAL EXAM
[Right] : right knee [5___] : hamstring 5[unfilled]/5 [Positive] : positive anterior draw [] : no extensor lag [de-identified] : using knee sleeve [TWNoteComboBox7] : flexion 115 degrees [de-identified] : extension 5 degrees
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
## 2023-06-06 NOTE — DATA REVIEWED
[MRI] : MRI [Right] : of the right [Knee] : knee [Report was reviewed and noted in the chart] : The report was reviewed and noted in the chart [I independently reviewed and interpreted images and report] : I independently reviewed and interpreted images and report [I reviewed the films/CD] : I reviewed the films/CD [FreeTextEntry1] : 06.01.23\par 1. Findings consistent with complete recurrent ACL tear and recent anterior tibial translation episode with \par complex recurrent medial meniscal tearing including a displaced meniscal flap arising from the posterior root, subchondral fracture in the posterior lateral tibial plateau and bone contusions in the medial compartment.\par 2. Irregularity along the osteochondral surface of the anterior medial femoral condyle over an area measuring 3 cm where there appears to be severe chondral delamination and probable unstable chondral flaps with multiple small loose bodies suspected.\par 3. Mild PCL thickening and buckling consistent with ACL insufficiency, mild MCL laxity without acute tear, \par mild extensor mechanism tendinopathy, moderate-to-large effusion with diffuse synovitis, multiple plicae, and chondral loss with joint space narrowing in the medial compartment.\par 4. Clinical correlation regarding prior surgery and recurrent trauma is recommended.\par 5. MRI anterior drawer sign.
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
## 2023-06-19 NOTE — PHYSICAL EXAM
[Normal] : orientation to person, place, and time: normal [de-identified] : R TMJ pain and clicking [de-identified] : well healed left palate excision site without evidence of tumor, lesion, ulceration, erythema or edema  No

## 2023-06-20 ENCOUNTER — APPOINTMENT (OUTPATIENT)
Dept: ORTHOPEDIC SURGERY | Facility: CLINIC | Age: 43
End: 2023-06-20
Payer: COMMERCIAL

## 2023-06-20 VITALS — WEIGHT: 215 LBS | BODY MASS INDEX: 27.59 KG/M2 | HEIGHT: 74 IN

## 2023-06-20 DIAGNOSIS — S83.231A COMPLEX TEAR OF MEDIAL MENISCUS, CURRENT INJURY, RIGHT KNEE, INITIAL ENCOUNTER: ICD-10-CM

## 2023-06-20 PROCEDURE — 99214 OFFICE O/P EST MOD 30 MIN: CPT
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
## 2023-06-21 NOTE — PHYSICAL EXAM
[Right] : right knee [5___] : hamstring 5[unfilled]/5 [Positive] : positive anterior draw [] : no extensor lag [de-identified] : using knee sleeve [TWNoteComboBox7] : flexion 115 degrees [de-identified] : extension 5 degrees
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
## 2023-07-13 ENCOUNTER — APPOINTMENT (OUTPATIENT)
Dept: CT IMAGING | Facility: CLINIC | Age: 43
End: 2023-07-13

## 2023-07-13 ENCOUNTER — APPOINTMENT (OUTPATIENT)
Dept: MRI IMAGING | Facility: CLINIC | Age: 43
End: 2023-07-13

## 2023-07-14 ENCOUNTER — APPOINTMENT (OUTPATIENT)
Dept: ORTHOPEDIC SURGERY | Facility: CLINIC | Age: 43
End: 2023-07-14
Payer: COMMERCIAL

## 2023-07-14 PROCEDURE — L1833: CPT | Mod: RT

## 2023-07-17 ENCOUNTER — APPOINTMENT (OUTPATIENT)
Dept: ORTHOPEDIC SURGERY | Facility: AMBULATORY SURGERY CENTER | Age: 43
End: 2023-07-17
Payer: COMMERCIAL

## 2023-07-17 DIAGNOSIS — S83.281A OTHER TEAR OF LATERAL MENISCUS, CURRENT INJURY, RIGHT KNEE, INITIAL ENCOUNTER: ICD-10-CM

## 2023-07-17 DIAGNOSIS — S83.511A SPRAIN OF ANTERIOR CRUCIATE LIGAMENT OF RIGHT KNEE, INITIAL ENCOUNTER: ICD-10-CM

## 2023-07-17 PROCEDURE — 29888 ARTHRS AID ACL RPR/AGMNTJ: CPT | Mod: AS,RT

## 2023-07-17 PROCEDURE — 29881 ARTHRS KNE SRG MNISECTMY M/L: CPT | Mod: AS,59,RT

## 2023-07-17 PROCEDURE — 29881 ARTHRS KNE SRG MNISECTMY M/L: CPT | Mod: 59,RT

## 2023-07-17 PROCEDURE — 29888 ARTHRS AID ACL RPR/AGMNTJ: CPT | Mod: RT

## 2023-07-17 RX ORDER — OXYCODONE 5 MG/1
5 TABLET ORAL
Qty: 42 | Refills: 0 | Status: ACTIVE | COMMUNITY
Start: 2023-07-17 | End: 1900-01-01

## 2023-07-17 RX ORDER — ASPIRIN 325 MG/1
325 TABLET, FILM COATED ORAL DAILY
Qty: 14 | Refills: 0 | Status: ACTIVE | COMMUNITY
Start: 2023-07-17 | End: 1900-01-01

## 2023-07-24 ENCOUNTER — APPOINTMENT (OUTPATIENT)
Dept: ORTHOPEDIC SURGERY | Facility: CLINIC | Age: 43
End: 2023-07-24
Payer: COMMERCIAL

## 2023-07-24 VITALS — BODY MASS INDEX: 27.59 KG/M2 | WEIGHT: 215 LBS | HEIGHT: 74 IN

## 2023-07-24 PROCEDURE — 99024 POSTOP FOLLOW-UP VISIT: CPT

## 2023-07-24 NOTE — HISTORY OF PRESENT ILLNESS
[de-identified] : dos: 07.17.23 right knee ACL revision\par 07/24/23: Here for pov1 s/p right knee acl recon revision with allograft. Doing well. Denies f/c/s. Compliant with brace. \par 6/20/23: Here to f/up right knee recurrent ACL tear. \par 6/6/23: Here to f/up right knee and review MRI results. \par 5/23/23: 42M injured R knee 10 days ago in [place], [place], fell. Knee is swollen and feels unstable. Decreased motion. Ice applied.\par \par dos: 10.03.22 - right knee acl recon\par 4/6/23: Here to f/up right knee s/p acl recon. Overall doing well. Feels that he aggravated his knee after exercises including an hour on the treadmil and has been seeing increased swelling. \par  2/2/23: Here for follow up on the right knee s/p acl recon. Reports continued swelling over the right knee.  \par 12/29/22: Here to f/up s/p right knee acl recon. Doing well. Experiencing variable swelling / stiffness. \par 11/15/22: Here for pov s/p right knee acl recon. Attending PT. c/o of some persistent swelling over the right knee. \par 10/18/22: here for pov2 s/p right knee acl recon. Doing well. Complaint with brace. Has started physical therapy.  \par 10/06/2022: Pt here for pov1 s/p right knee ACL recon. Doing well. Complaint with brace. Denies f/c/s. \par \par 09/30/22: Here to f/up right knee. Now scheduled for right knee ACL recon with medial meniscal root repair for 10.03.22. Presents today with additional questions prior to surgery.\par 09/01/22: Here to f/up right knee. PT and bracing has been mildly beneficial. Surgery had been discussed at last visit and pt is ready to schedule. \par 08/04/22: Here to f/up right knee and review MRI results. \par 08/02/2022 Mr. DEVIN SHETH,  41 year old  male , presents today for right knee. Injured on 07.29.22 with a twist and fall. Reports right knee pain and swelling. Difficulty with walking and has been using crutches for assistance. Also with left knee abrasion.  [FreeTextEntry1] :  Rt Knee [FreeTextEntry5] : PO 1: Rt Knee Sx: 07/17/23. Pt pain slightly better. Pt feeling some N/T. Using crutched and B;edsoe brace for support. [de-identified] : sx: 07/17/23

## 2023-07-24 NOTE — DISCUSSION/SUMMARY
[de-identified] : 42m s/p right knee acl recon revision with allograft 07.17.23\par 1) c/w caryn brace locked in extension, wbat\par 2) start physical therapy - protocol provided\par 3) reviewed post-op precautions and procedures.\par 4) cryotherapy, rest and activity modification\par 5) rtc 2 weeks \par \par Entered by Chanel Tinsley acting as scribe.\par Dr. Delgado-\par The documentation recorded by the scribe accurately reflects the service I personally performed and the decisions made by me.

## 2023-08-08 ENCOUNTER — APPOINTMENT (OUTPATIENT)
Dept: ORTHOPEDIC SURGERY | Facility: CLINIC | Age: 43
End: 2023-08-08
Payer: COMMERCIAL

## 2023-08-08 VITALS — BODY MASS INDEX: 27.59 KG/M2 | WEIGHT: 215 LBS | HEIGHT: 74 IN

## 2023-08-08 PROCEDURE — 99024 POSTOP FOLLOW-UP VISIT: CPT

## 2023-08-08 NOTE — DISCUSSION/SUMMARY
[de-identified] : 42m s/p right knee acl recon revision with allograft 07.17.23 1) c/w caryn brace 0-90, wbat, crutches only as needed 2) c/w  physical therapy - protocol provided 3) reviewed post-op precautions and procedures. 4) cryotherapy, rest and activity modification 5) rtc 3 weeks   Entered by Chanel Tinsley acting as scribe. Dr. Delgado- The documentation recorded by the scribe accurately reflects the service I personally performed and the decisions made by me.

## 2023-08-08 NOTE — HISTORY OF PRESENT ILLNESS
[3] : 3 [de-identified] : dos: 07.17.23 right knee ACL revision 8/8/23: Here for pov s/p right knee acl recon revision. Attending PT. Doing well.  07/24/23: Here for pov1 s/p right knee acl recon revision with allograft. Doing well. Denies f/c/s. Compliant with brace.  6/20/23: Here to f/up right knee recurrent ACL tear.  6/6/23: Here to f/up right knee and review MRI results.  5/23/23: 42M injured R knee 10 days ago in [place], [place], fell. Knee is swollen and feels unstable. Decreased motion. Ice applied.  dos: 10.03.22 - right knee acl recon 4/6/23: Here to f/up right knee s/p acl recon. Overall doing well. Feels that he aggravated his knee after exercises including an hour on the treadmil and has been seeing increased swelling.   2/2/23: Here for follow up on the right knee s/p acl recon. Reports continued swelling over the right knee.   12/29/22: Here to f/up s/p right knee acl recon. Doing well. Experiencing variable swelling / stiffness.  11/15/22: Here for pov s/p right knee acl recon. Attending PT. c/o of some persistent swelling over the right knee.  10/18/22: here for pov2 s/p right knee acl recon. Doing well. Complaint with brace. Has started physical therapy.   10/06/2022: Pt here for pov1 s/p right knee ACL recon. Doing well. Complaint with brace. Denies f/c/s.   09/30/22: Here to f/up right knee. Now scheduled for right knee ACL recon with medial meniscal root repair for 10.03.22. Presents today with additional questions prior to surgery. 09/01/22: Here to f/up right knee. PT and bracing has been mildly beneficial. Surgery had been discussed at last visit and pt is ready to schedule.  08/04/22: Here to f/up right knee and review MRI results.  08/02/2022 Mr. DEVIN SHETH,  41 year old  male , presents today for right knee. Injured on 07.29.22 with a twist and fall. Reports right knee pain and swelling. Difficulty with walking and has been using crutches for assistance. Also with left knee abrasion.  [FreeTextEntry1] :  Rt Knee [FreeTextEntry5] : PO 2: Rt Knee Sx: 07/17/23. Pt pain slightly better. Pt still feeling some occasional N/T. Using crutches and Joey brace for support. Pt compliant with physical therapy going 3 times a week and feels like it's helping.  [de-identified] : Physical Therapy

## 2023-08-08 NOTE — PHYSICAL EXAM
[Right] : right knee [NL (0)] : extension 0 degrees [] : no extensor lag [TWNoteComboBox7] : flexion 125 degrees

## 2023-08-31 ENCOUNTER — APPOINTMENT (OUTPATIENT)
Dept: ORTHOPEDIC SURGERY | Facility: CLINIC | Age: 43
End: 2023-08-31
Payer: COMMERCIAL

## 2023-08-31 VITALS — WEIGHT: 215 LBS | HEIGHT: 74 IN | BODY MASS INDEX: 27.59 KG/M2

## 2023-08-31 PROCEDURE — 99024 POSTOP FOLLOW-UP VISIT: CPT

## 2023-08-31 NOTE — PHYSICAL EXAM
[Right] : right knee [NL (0)] : extension 0 degrees [Negative] : negative Inocencio's [] : light touch is intact throughout [TWNoteComboBox7] : flexion 125 degrees

## 2023-08-31 NOTE — DISCUSSION/SUMMARY
[de-identified] : 42m s/p right knee acl recon revision with allograft 07.17.23 1) can start to gradually d/c the brace 2) c/w physical therapy - protocol provided 3) reviewed post-op precautions and procedures. 4) cryotherapy, rest and activity modification 5) rtc 6 weeks   Entered by Chanel Tinsley acting as scribe. Dr. Delgado- The documentation recorded by the scribe accurately reflects the service I personally performed and the decisions made by me.

## 2023-08-31 NOTE — HISTORY OF PRESENT ILLNESS
[3] : 3 [0] : 0 [Tightness] : tightness [de-identified] : dos: 10.03.22 - right knee acl recon dos: 07.17.23 right knee ACL revision 8/31/23: Here for pov s/p right knee acl recon revision. Attending PT.  8/8/23: Here for pov s/p right knee acl recon revision. Attending PT. Doing well.  07/24/23: Here for pov1 s/p right knee acl recon revision with allograft. Doing well. Denies f/c/s. Compliant with brace.  6/20/23: Here to f/up right knee recurrent ACL tear.  6/6/23: Here to f/up right knee and review MRI results.  5/23/23: 42M injured R knee 10 days ago in [place], [place], fell. Knee is swollen and feels unstable. Decreased motion. Ice applied. _______________ 4/6/23: Here to f/up right knee s/p acl recon. Overall doing well. Feels that he aggravated his knee after exercises including an hour on the treadmil and has been seeing increased swelling.   2/2/23: Here for follow up on the right knee s/p acl recon. Reports continued swelling over the right knee.   12/29/22: Here to f/up s/p right knee acl recon. Doing well. Experiencing variable swelling / stiffness.  11/15/22: Here for pov s/p right knee acl recon. Attending PT. c/o of some persistent swelling over the right knee.  10/18/22: here for pov2 s/p right knee acl recon. Doing well. Complaint with brace. Has started physical therapy.   10/06/2022: Pt here for pov1 s/p right knee ACL recon. Doing well. Complaint with brace. Denies f/c/s.  ________________ 09/30/22: Here to f/up right knee. Now scheduled for right knee ACL recon with medial meniscal root repair for 10.03.22. Presents today with additional questions prior to surgery. 09/01/22: Here to f/up right knee. PT and bracing has been mildly beneficial. Surgery had been discussed at last visit and pt is ready to schedule.  08/04/22: Here to f/up right knee and review MRI results.  08/02/2022 Mr. DEVIN SHETH,  41 year old  male , presents today for right knee. Injured on 07.29.22 with a twist and fall. Reports right knee pain and swelling. Difficulty with walking and has been using crutches for assistance. Also with left knee abrasion.  [] : no [FreeTextEntry1] :  Rt Knee [FreeTextEntry5] : PO 2: Rt Knee Sx: 07/17/23. States pain has decreased. Feels he slightly tweaked it. Joey brace for support. Goes to PT 3x a week. States its helping.  [de-identified] : Physical Therapy

## 2023-10-12 ENCOUNTER — APPOINTMENT (OUTPATIENT)
Dept: ORTHOPEDIC SURGERY | Facility: CLINIC | Age: 43
End: 2023-10-12
Payer: COMMERCIAL

## 2023-10-12 VITALS — BODY MASS INDEX: 27.59 KG/M2 | WEIGHT: 215 LBS | HEIGHT: 74 IN

## 2023-10-12 PROCEDURE — 99024 POSTOP FOLLOW-UP VISIT: CPT

## 2023-10-18 ENCOUNTER — APPOINTMENT (OUTPATIENT)
Dept: OTOLARYNGOLOGY | Facility: CLINIC | Age: 43
End: 2023-10-18
Payer: COMMERCIAL

## 2023-10-18 VITALS — WEIGHT: 210 LBS | BODY MASS INDEX: 27.83 KG/M2 | HEIGHT: 73 IN

## 2023-10-18 DIAGNOSIS — C05.9 MALIGNANT NEOPLASM OF PALATE, UNSPECIFIED: ICD-10-CM

## 2023-10-18 DIAGNOSIS — M26.629 ARTHRALGIA OF TEMPOROMANDIBULAR JOINT,: ICD-10-CM

## 2023-10-18 DIAGNOSIS — H61.23 IMPACTED CERUMEN, BILATERAL: ICD-10-CM

## 2023-10-18 DIAGNOSIS — R68.84 JAW PAIN: ICD-10-CM

## 2023-10-18 PROCEDURE — 99214 OFFICE O/P EST MOD 30 MIN: CPT | Mod: 25

## 2023-10-18 PROCEDURE — 69210 REMOVE IMPACTED EAR WAX UNI: CPT

## 2023-10-18 PROCEDURE — 31575 DIAGNOSTIC LARYNGOSCOPY: CPT

## 2023-10-18 RX ORDER — ALPRAZOLAM 2 MG/1
TABLET ORAL
Refills: 0 | Status: ACTIVE | COMMUNITY

## 2023-10-18 RX ORDER — OFLOXACIN OTIC 3 MG/ML
0.3 SOLUTION AURICULAR (OTIC) TWICE DAILY
Qty: 1 | Refills: 2 | Status: ACTIVE | COMMUNITY
Start: 2023-10-18 | End: 1900-01-01

## 2023-10-18 RX ORDER — PANTOPRAZOLE 40 MG/1
40 TABLET, DELAYED RELEASE ORAL
Qty: 30 | Refills: 3 | Status: ACTIVE | COMMUNITY
Start: 2023-10-18 | End: 1900-01-01

## 2023-11-24 ENCOUNTER — APPOINTMENT (OUTPATIENT)
Dept: ORTHOPEDIC SURGERY | Facility: CLINIC | Age: 43
End: 2023-11-24
Payer: COMMERCIAL

## 2023-11-24 VITALS — BODY MASS INDEX: 27.83 KG/M2 | WEIGHT: 210 LBS | HEIGHT: 73 IN

## 2023-11-24 PROCEDURE — 99213 OFFICE O/P EST LOW 20 MIN: CPT

## 2023-12-11 ENCOUNTER — NON-APPOINTMENT (OUTPATIENT)
Age: 43
End: 2023-12-11

## 2024-01-22 ENCOUNTER — APPOINTMENT (OUTPATIENT)
Dept: ORTHOPEDIC SURGERY | Facility: CLINIC | Age: 44
End: 2024-01-22
Payer: COMMERCIAL

## 2024-01-22 PROCEDURE — 99213 OFFICE O/P EST LOW 20 MIN: CPT

## 2024-01-22 NOTE — DISCUSSION/SUMMARY
[de-identified] : 43m s/p right knee acl recon revision with allograft 07.17.23 1) c/w physical therapy and hep  2) cryotherapy, rest and activity modification 3) rtc 3 months  Entered by Chanel Tinsley acting as scribe. Dr. Delgado- The documentation recorded by the scribe accurately reflects the service I personally performed and the decisions made by me.

## 2024-01-22 NOTE — HISTORY OF PRESENT ILLNESS
[3] : 3 [Tightness] : tightness [Intermittent] : intermittent [] : yes [de-identified] : dos: 10.03.22 - right knee acl recon dos: 07.17.23 right knee ACL revision 1/22/24: Here to f/up right knee. Overall doing well. C/o persistent stiffness and weakness. Attending PT.  11/24/23: s/p right knee ACL revision. He is doing well but tweaked his knee last week. He has been doing PT with good progress. Some swelling with increased activity.  10/12/23: s/p right knee ACL revision. Currently doing PT 3x a week. Doing well. Noticed some swelling after increased activity.   8/31/23: Here for pov s/p right knee acl recon revision. Attending PT.  8/8/23: Here for pov s/p right knee acl recon revision. Attending PT. Doing well.  07/24/23: Here for pov1 s/p right knee acl recon revision with allograft. Doing well. Denies f/c/s. Compliant with brace.  6/20/23: Here to f/up right knee recurrent ACL tear.  6/6/23: Here to f/up right knee and review MRI results.  5/23/23: 42M injured R knee 10 days ago in [place], [place], fell. Knee is swollen and feels unstable. Decreased motion. Ice applied. _______________ 4/6/23: Here to f/up right knee s/p acl recon. Overall doing well. Feels that he aggravated his knee after exercises including an hour on the treadmil and has been seeing increased swelling.   2/2/23: Here for follow up on the right knee s/p acl recon. Reports continued swelling over the right knee.   12/29/22: Here to f/up s/p right knee acl recon. Doing well. Experiencing variable swelling / stiffness.  11/15/22: Here for pov s/p right knee acl recon. Attending PT. c/o of some persistent swelling over the right knee.  10/18/22: here for pov2 s/p right knee acl recon. Doing well. Complaint with brace. Has started physical therapy.   10/06/2022: Pt here for pov1 s/p right knee ACL recon. Doing well. Complaint with brace. Denies f/c/s.  ________________ 09/30/22: Here to f/up right knee. Now scheduled for right knee ACL recon with medial meniscal root repair for 10.03.22. Presents today with additional questions prior to surgery. 09/01/22: Here to f/up right knee. PT and bracing has been mildly beneficial. Surgery had been discussed at last visit and pt is ready to schedule.  08/04/22: Here to f/up right knee and review MRI results.  08/02/2022 Mr. DEVIN SHETH,  41 year old  male , presents today for right knee. Injured on 07.29.22 with a twist and fall. Reports right knee pain and swelling. Difficulty with walking and has been using crutches for assistance. Also with left knee abrasion.  [FreeTextEntry1] : right knee  [FreeTextEntry5] : DEVIN is here today to follow up on his right knee. pt states since last visit, pain decreased. attending PT. states he weaned off of brace. c/o tightness > pain, some discomfort with cold weather.  [de-identified] : 07/17/23

## 2024-01-22 NOTE — PHYSICAL EXAM
[Right] : right knee [NL (0)] : extension 0 degrees [Negative] : negative Inocencio's [] : no pain with varus stress [FreeTextEntry8] : calf is soft and compressible, no sign of dvt  [TWNoteComboBox7] : flexion 130 degrees

## 2024-03-25 ENCOUNTER — APPOINTMENT (OUTPATIENT)
Dept: ORTHOPEDIC SURGERY | Facility: CLINIC | Age: 44
End: 2024-03-25
Payer: COMMERCIAL

## 2024-03-25 VITALS — WEIGHT: 210 LBS | BODY MASS INDEX: 27.83 KG/M2 | HEIGHT: 73 IN

## 2024-03-25 PROCEDURE — 99213 OFFICE O/P EST LOW 20 MIN: CPT

## 2024-03-25 NOTE — HISTORY OF PRESENT ILLNESS
[0] : 0 [Intermittent] : intermittent [Tightness] : tightness [] : yes [de-identified] : dos: 10.03.22 - right knee acl recon dos: 07.17.23 right knee ACL revision 3/25/24: Here to f/up right knee. Attending PT, doing well  1/22/24: Here to f/up right knee. Overall doing well. C/o persistent stiffness and weakness. Attending PT.  11/24/23: s/p right knee ACL revision. He is doing well but tweaked his knee last week. He has been doing PT with good progress. Some swelling with increased activity.  10/12/23: s/p right knee ACL revision. Currently doing PT 3x a week. Doing well. Noticed some swelling after increased activity.   8/31/23: Here for pov s/p right knee acl recon revision. Attending PT.  8/8/23: Here for pov s/p right knee acl recon revision. Attending PT. Doing well.  07/24/23: Here for pov1 s/p right knee acl recon revision with allograft. Doing well. Denies f/c/s. Compliant with brace.  6/20/23: Here to f/up right knee recurrent ACL tear.  6/6/23: Here to f/up right knee and review MRI results.  5/23/23: 42M injured R knee 10 days ago in [place], [place], fell. Knee is swollen and feels unstable. Decreased motion. Ice applied. _______________ 4/6/23: Here to f/up right knee s/p acl recon. Overall doing well. Feels that he aggravated his knee after exercises including an hour on the treadmil and has been seeing increased swelling.   2/2/23: Here for follow up on the right knee s/p acl recon. Reports continued swelling over the right knee.   12/29/22: Here to f/up s/p right knee acl recon. Doing well. Experiencing variable swelling / stiffness.  11/15/22: Here for pov s/p right knee acl recon. Attending PT. c/o of some persistent swelling over the right knee.  10/18/22: here for pov2 s/p right knee acl recon. Doing well. Complaint with brace. Has started physical therapy.   10/06/2022: Pt here for pov1 s/p right knee ACL recon. Doing well. Complaint with brace. Denies f/c/s.  ________________ 09/30/22: Here to f/up right knee. Now scheduled for right knee ACL recon with medial meniscal root repair for 10.03.22. Presents today with additional questions prior to surgery. 09/01/22: Here to f/up right knee. PT and bracing has been mildly beneficial. Surgery had been discussed at last visit and pt is ready to schedule.  08/04/22: Here to f/up right knee and review MRI results.  08/02/2022 Mr. DEVIN SHETH,  41 year old  male , presents today for right knee. Injured on 07.29.22 with a twist and fall. Reports right knee pain and swelling. Difficulty with walking and has been using crutches for assistance. Also with left knee abrasion.  [FreeTextEntry1] : right knee  [FreeTextEntry5] : continuing PT and HEP [de-identified] : 07/17/23

## 2024-03-25 NOTE — PHYSICAL EXAM
[Right] : right knee [NL (0)] : extension 0 degrees [Negative] : negative Inocencio's [] : non-antalgic [FreeTextEntry8] : calf is soft and compressible, no sign of dvt  [de-identified] : quad strength improving  [TWNoteComboBox7] : flexion 130 degrees

## 2024-03-25 NOTE — DISCUSSION/SUMMARY
[de-identified] : 43m s/p right knee acl recon revision with allograft 07.17.23. Improving well with PT 1) c/w PT and hep  2) cryotherapy, rest and activity modification  3) rtc 4 months at 1 year post-op   Entered by Chanel Tinsley acting as scribe. Dr. Delgado- The documentation recorded by the scribe accurately reflects the service I personally performed and the decisions made by me.

## 2024-05-21 ENCOUNTER — APPOINTMENT (OUTPATIENT)
Dept: ORTHOPEDIC SURGERY | Facility: CLINIC | Age: 44
End: 2024-05-21
Payer: COMMERCIAL

## 2024-05-21 DIAGNOSIS — Z98.890 OTHER SPECIFIED POSTPROCEDURAL STATES: ICD-10-CM

## 2024-05-21 PROCEDURE — 99213 OFFICE O/P EST LOW 20 MIN: CPT

## 2024-05-21 NOTE — PHYSICAL EXAM
[Right] : right knee [NL (0)] : extension 0 degrees [Negative] : negative Inocencio's [] : non-antalgic [FreeTextEntry8] : calf is soft and compressible, no sign of dvt  [de-identified] : quad strength improving  [TWNoteComboBox7] : flexion 130 degrees

## 2024-05-21 NOTE — HISTORY OF PRESENT ILLNESS
[7] : 7 [6] : 6 [Dull/Aching] : dull/aching [Tightness] : tightness [Intermittent] : intermittent [Household chores] : household chores [Work] : work [Nothing helps with pain getting better] : Nothing helps with pain getting better [de-identified] : dos: 10.03.22 - right knee acl recon dos: 07.17.23 right knee ACL revision 5/21/24: Here to f/up right knee. Reports that on 5/13 he was playing with his kids and jumped up to catch a ball, landed on his right leg and has had some increased right knee pain.  3/25/24: Here to f/up right knee. Attending PT, doing well  1/22/24: Here to f/up right knee. Overall doing well. C/o persistent stiffness and weakness. Attending PT.  11/24/23: s/p right knee ACL revision. He is doing well but tweaked his knee last week. He has been doing PT with good progress. Some swelling with increased activity.  10/12/23: s/p right knee ACL revision. Currently doing PT 3x a week. Doing well. Noticed some swelling after increased activity.   8/31/23: Here for pov s/p right knee acl recon revision. Attending PT.  8/8/23: Here for pov s/p right knee acl recon revision. Attending PT. Doing well.  07/24/23: Here for pov1 s/p right knee acl recon revision with allograft. Doing well. Denies f/c/s. Compliant with brace.  6/20/23: Here to f/up right knee recurrent ACL tear.  6/6/23: Here to f/up right knee and review MRI results.  5/23/23: 42M injured R knee 10 days ago in [place], [place], fell. Knee is swollen and feels unstable. Decreased motion. Ice applied. _______________ 4/6/23: Here to f/up right knee s/p acl recon. Overall doing well. Feels that he aggravated his knee after exercises including an hour on the treadmil and has been seeing increased swelling.   2/2/23: Here for follow up on the right knee s/p acl recon. Reports continued swelling over the right knee.   12/29/22: Here to f/up s/p right knee acl recon. Doing well. Experiencing variable swelling / stiffness.  11/15/22: Here for pov s/p right knee acl recon. Attending PT. c/o of some persistent swelling over the right knee.  10/18/22: here for pov2 s/p right knee acl recon. Doing well. Complaint with brace. Has started physical therapy.   10/06/2022: Pt here for pov1 s/p right knee ACL recon. Doing well. Complaint with brace. Denies f/c/s.  ________________ 09/30/22: Here to f/up right knee. Now scheduled for right knee ACL recon with medial meniscal root repair for 10.03.22. Presents today with additional questions prior to surgery. 09/01/22: Here to f/up right knee. PT and bracing has been mildly beneficial. Surgery had been discussed at last visit and pt is ready to schedule.  08/04/22: Here to f/up right knee and review MRI results.  08/02/2022 Mr. DEVIN SHETH,  41 year old  male , presents today for right knee. Injured on 07.29.22 with a twist and fall. Reports right knee pain and swelling. Difficulty with walking and has been using crutches for assistance. Also with left knee abrasion.  [] : no [FreeTextEntry1] : right knee  [FreeTextEntry5] : pt is here for RT knee pain, states he was out playing 05/13/24, was playing lacrosse, jumped high to receive the ball, landing more on rt knee, felt pressure, slight swelling, feels weak.  [de-identified] : certain movement  [de-identified] : 07/17/23

## 2024-05-21 NOTE — DISCUSSION/SUMMARY
[de-identified] : 43m s/p right knee acl recon revision with allograft 07.17.23. Improving well with PT. s/p aggravation of pain 1) c/w PT and hep  2) cryotherapy, rest and activity modification  3) rtc 2 months   Entered by Chanel Tinsley acting as scribe. Dr. Delgado- The documentation recorded by the scribe accurately reflects the service I personally performed and the decisions made by me.

## 2024-07-25 ENCOUNTER — APPOINTMENT (OUTPATIENT)
Dept: ORTHOPEDIC SURGERY | Facility: CLINIC | Age: 44
End: 2024-07-25
Payer: COMMERCIAL

## 2024-07-25 VITALS — HEIGHT: 73 IN | WEIGHT: 210 LBS | BODY MASS INDEX: 27.83 KG/M2

## 2024-07-25 DIAGNOSIS — Z98.890 OTHER SPECIFIED POSTPROCEDURAL STATES: ICD-10-CM

## 2024-07-25 PROCEDURE — 99213 OFFICE O/P EST LOW 20 MIN: CPT

## 2024-07-25 NOTE — PHYSICAL EXAM
[Right] : right knee [NL (0)] : extension 0 degrees [Negative] : negative Inocencio's [] : non-antalgic [FreeTextEntry8] : calf is soft and compressible, no sign of dvt  [de-identified] : quad strength improving  [TWNoteComboBox7] : flexion 135 degrees

## 2024-07-25 NOTE — HISTORY OF PRESENT ILLNESS
[facility]  Operative Note    Date:  2024    Patient:  Cinthya Stoddard  MRN:  [id number]   :  1956    Surgeon:  Vladimir Steward MD    PREOPERATIVE DIAGNOSIS:  Visually significant age-related nuclear and cortical cataract, left eye.    POSTOPERATIVE DIAGNOSIS:  Visually significant age-related nuclear and cortical cataract, left eye.    PROCEDURE:  Phacoemulsification of cataract with posterior chamber intraocular lens implant, left eye.    ANESTHESIA:  Topical with MAC.      COMPLICATIONS:  None.    ESTIMATED BLOOD LOSS:  Minimal.    PROCEDURE IN DETAIL:  The patient presented to our clinic complaining of increasing difficulties with activities of daily living due to a visually significant cataract in the left eye.  After risks, benefits, and alternatives were explained to the patient, the patient agreed to proceed with the above procedure.  The patient was brought to the operating room and received topical anesthetic to the left eye and was then prepped and draped in the usual sterile fashion.  The left eye was first entered at 5:00 o'clock paracentesis site followed by intracameral lidocaine, and Viscoat.  The primary surgical site was then created at 2:30 o'clock followed by continuous capsulotomy, hydrodissection, and phacoemulsification of the cataract.  Residual cortical material was removed using the automated irrigation-aspiration technique.  Provisc was injected into the posterior chamber and an Conrado SY60WF 22.5 diopter lens was placed in the bag without difficulty.  Residual Provisc was removed using the automated irrigation-aspiration technique.  The eye was re-pressurized using BSS solution, and both the paracentesis and primary surgical site were demonstrated to be watertight at the end of the case with Weck-Camille manipulation.  Vigamox, Pred Forte, and Prolensa were placed in the eye followed by placement of a Willett shield.  The patient tolerated the procedure well and was taken to  [6] : 6 the recovery room in good and stable condition.  The patient was instructed to refrain from any heavy lifting, bending, stooping, or straining activities and to follow-up in the morning for routine post-operative care with Dr. Vladimir Steward.     [Dull/Aching] : dull/aching [Tightness] : tightness [Intermittent] : intermittent [Household chores] : household chores [Work] : work [Nothing helps with pain getting better] : Nothing helps with pain getting better [3] : 3 [de-identified] : dos: 10.03.22 - right knee acl recon dos: 07.17.23 right knee ACL revision 7/25/24: Here to f/up right knee, now 1 year s/p ACL revision. Doing well. Minimal remaining complaints, occasional stiffness or sense of "pressure" over the anterior aspect.  5/21/24: Here to f/up right knee. Reports that on 5/13 he was playing with his kids and jumped up to catch a ball, landed on his right leg and has had some increased right knee pain.  3/25/24: Here to f/up right knee. Attending PT, doing well  1/22/24: Here to f/up right knee. Overall doing well. C/o persistent stiffness and weakness. Attending PT.  11/24/23: s/p right knee ACL revision. He is doing well but tweaked his knee last week. He has been doing PT with good progress. Some swelling with increased activity.  10/12/23: s/p right knee ACL revision. Currently doing PT 3x a week. Doing well. Noticed some swelling after increased activity.   8/31/23: Here for pov s/p right knee acl recon revision. Attending PT.  8/8/23: Here for pov s/p right knee acl recon revision. Attending PT. Doing well.  07/24/23: Here for pov1 s/p right knee acl recon revision with allograft. Doing well. Denies f/c/s. Compliant with brace.  6/20/23: Here to f/up right knee recurrent ACL tear.  6/6/23: Here to f/up right knee and review MRI results.  5/23/23: 42M injured R knee 10 days ago in [place], [place], fell. Knee is swollen and feels unstable. Decreased motion. Ice applied. _______________ 4/6/23: Here to f/up right knee s/p acl recon. Overall doing well. Feels that he aggravated his knee after exercises including an hour on the treadmil and has been seeing increased swelling.   2/2/23: Here for follow up on the right knee s/p acl recon. Reports continued swelling over the right knee.   12/29/22: Here to f/up s/p right knee acl recon. Doing well. Experiencing variable swelling / stiffness.  11/15/22: Here for pov s/p right knee acl recon. Attending PT. c/o of some persistent swelling over the right knee.  10/18/22: here for pov2 s/p right knee acl recon. Doing well. Complaint with brace. Has started physical therapy.   10/06/2022: Pt here for pov1 s/p right knee ACL recon. Doing well. Complaint with brace. Denies f/c/s.  ________________ 09/30/22: Here to f/up right knee. Now scheduled for right knee ACL recon with medial meniscal root repair for 10.03.22. Presents today with additional questions prior to surgery. 09/01/22: Here to f/up right knee. PT and bracing has been mildly beneficial. Surgery had been discussed at last visit and pt is ready to schedule.  08/04/22: Here to f/up right knee and review MRI results.  08/02/2022 Mr. DEVIN SHETH,  41 year old  male , presents today for right knee. Injured on 07.29.22 with a twist and fall. Reports right knee pain and swelling. Difficulty with walking and has been using crutches for assistance. Also with left knee abrasion.  [] : no [FreeTextEntry1] : right knee  [FreeTextEntry5] : continuing 3/wk with some improvement. still experiencing some pressure. [de-identified] : certain movement  [de-identified] : 07/17/23

## 2024-07-25 NOTE — DISCUSSION/SUMMARY
[de-identified] : 43m now 1 year s/p s/p right knee acl recon revision with allograft 07.17.23.  1) PT / hep ; strengthening 2) cryotherapy, rest and activity modification  3) Discussed gradual increase with activity / exercise as tolerated 4) rtc 3 months   Entered by Chanel Tinsley acting as scribe. Dr. Delgado- The documentation recorded by the scribe accurately reflects the service I personally performed and the decisions made by me.

## 2024-11-22 ENCOUNTER — APPOINTMENT (OUTPATIENT)
Dept: ORTHOPEDIC SURGERY | Facility: CLINIC | Age: 44
End: 2024-11-22
Payer: COMMERCIAL

## 2024-11-22 DIAGNOSIS — Z98.890 OTHER SPECIFIED POSTPROCEDURAL STATES: ICD-10-CM

## 2024-11-22 PROCEDURE — 99213 OFFICE O/P EST LOW 20 MIN: CPT

## 2025-02-10 ENCOUNTER — APPOINTMENT (OUTPATIENT)
Dept: ORTHOPEDIC SURGERY | Facility: CLINIC | Age: 45
End: 2025-02-10

## 2025-03-18 ENCOUNTER — APPOINTMENT (OUTPATIENT)
Dept: ORTHOPEDIC SURGERY | Facility: CLINIC | Age: 45
End: 2025-03-18
Payer: COMMERCIAL

## 2025-03-18 VITALS — WEIGHT: 210 LBS | HEIGHT: 73 IN | BODY MASS INDEX: 27.83 KG/M2

## 2025-03-18 DIAGNOSIS — Z98.890 OTHER SPECIFIED POSTPROCEDURAL STATES: ICD-10-CM

## 2025-03-18 PROCEDURE — 73564 X-RAY EXAM KNEE 4 OR MORE: CPT | Mod: RT

## 2025-03-18 PROCEDURE — 99213 OFFICE O/P EST LOW 20 MIN: CPT

## 2025-06-19 ENCOUNTER — APPOINTMENT (OUTPATIENT)
Dept: ORTHOPEDIC SURGERY | Facility: CLINIC | Age: 45
End: 2025-06-19
Payer: COMMERCIAL

## 2025-06-19 VITALS — WEIGHT: 210 LBS | BODY MASS INDEX: 27.83 KG/M2 | HEIGHT: 73 IN

## 2025-06-19 PROCEDURE — 99213 OFFICE O/P EST LOW 20 MIN: CPT

## 2025-07-09 NOTE — HISTORY OF PRESENT ILLNESS
Palliative diagnosis: Adenocarcinoma of the pancreas  PPS: 90%    Education/counseling provided on role/purpose of palliative care; benefits/risks of treatment options by our team reviewed; instructions for management and anticipatory guidance provided; supportive listening and presence provided; provided space for life review and whole person assessment    Follow-up planning: Recommending follow-up in approximately 3 months to early October to do medical marijuana re-certification        [3] : 3 [0] : 0 [Dull/Aching] : dull/aching [Constant] : constant [Household chores] : household chores [Leisure] : leisure [Ice] : ice [Stairs] : stairs [Full time] : Work status: full time [de-identified] : dos: 10.03.22\par \par 4/6/23: Here to f/up right knee s/p acl recon. Overall doing well. Feels that he aggravated his knee after exercises including an hour on the treadmil and has been seeing increased swelling. \par  2/2/23: Here for follow up on the right knee s/p acl recon. Reports continued swelling over the right knee.  \par 12/29/22: Here to f/up s/p right knee acl recon. Doing well. Experiencing variable swelling / stiffness. \par 11/15/22: Here for pov s/p right knee acl recon. Attending PT. c/o of some persistent swelling over the right knee. \par 10/18/22: here for pov2 s/p right knee acl recon. Doing well. Complaint with brace. Has started physical therapy.  \par 10/06/2022: Pt here for pov1 s/p right knee ACL recon. Doing well. Complaint with brace. Denies f/c/s. \par \par 09/30/22: Here to f/up right knee. Now scheduled for right knee ACL recon with medial meniscal root repair for 10.03.22. Presents today with additional questions prior to surgery.\par 09/01/22: Here to f/up right knee. PT and bracing has been mildly beneficial. Surgery had been discussed at last visit and pt is ready to schedule. \par 08/04/22: Here to f/up right knee and review MRI results. \par 08/02/2022 Mr. DEVIN SHETH,  41 year old  male , presents today for right knee. Injured on 07.29.22 with a twist and fall. Reports right knee pain and swelling. Difficulty with walking and has been using crutches for assistance. Also with left knee abrasion.  [] : Post Surgical Visit: no [FreeTextEntry1] : Right knee [FreeTextEntry5] : Pt feeling improvement in the knee. Pt states he did try to going to gym and working out and push his limit a little bit last week and noticed some swelling in the knee. The knee is better since then though swelling came down but he feels like there could be a little fluid.  [FreeTextEntry6] : Occasional Pressure [de-identified] : 12/23/22 [de-identified] : 10/03/22 [de-identified] : Right knee arthroscopy

## 2025-09-18 ENCOUNTER — APPOINTMENT (OUTPATIENT)
Dept: ORTHOPEDIC SURGERY | Facility: CLINIC | Age: 45
End: 2025-09-18
Payer: COMMERCIAL

## 2025-09-18 VITALS — WEIGHT: 210 LBS | HEIGHT: 73 IN | BODY MASS INDEX: 27.83 KG/M2

## 2025-09-18 DIAGNOSIS — Z98.890 OTHER SPECIFIED POSTPROCEDURAL STATES: ICD-10-CM

## 2025-09-18 PROCEDURE — 99213 OFFICE O/P EST LOW 20 MIN: CPT
